# Patient Record
Sex: FEMALE | Race: WHITE | HISPANIC OR LATINO | Employment: FULL TIME | URBAN - METROPOLITAN AREA
[De-identification: names, ages, dates, MRNs, and addresses within clinical notes are randomized per-mention and may not be internally consistent; named-entity substitution may affect disease eponyms.]

---

## 2023-11-17 ENCOUNTER — APPOINTMENT (OUTPATIENT)
Dept: RADIOLOGY | Facility: MEDICAL CENTER | Age: 48
End: 2023-11-17
Attending: STUDENT IN AN ORGANIZED HEALTH CARE EDUCATION/TRAINING PROGRAM

## 2023-11-17 ENCOUNTER — HOSPITAL ENCOUNTER (EMERGENCY)
Facility: MEDICAL CENTER | Age: 48
End: 2023-11-17
Attending: STUDENT IN AN ORGANIZED HEALTH CARE EDUCATION/TRAINING PROGRAM

## 2023-11-17 VITALS
WEIGHT: 132.94 LBS | HEIGHT: 60 IN | HEART RATE: 78 BPM | DIASTOLIC BLOOD PRESSURE: 70 MMHG | BODY MASS INDEX: 26.1 KG/M2 | SYSTOLIC BLOOD PRESSURE: 113 MMHG | TEMPERATURE: 97.3 F | RESPIRATION RATE: 17 BRPM | OXYGEN SATURATION: 96 %

## 2023-11-17 DIAGNOSIS — R51.9 NONINTRACTABLE HEADACHE, UNSPECIFIED CHRONICITY PATTERN, UNSPECIFIED HEADACHE TYPE: ICD-10-CM

## 2023-11-17 DIAGNOSIS — R07.9 CHEST PAIN, UNSPECIFIED TYPE: ICD-10-CM

## 2023-11-17 DIAGNOSIS — R06.00 DYSPNEA, UNSPECIFIED TYPE: ICD-10-CM

## 2023-11-17 LAB
ALBUMIN SERPL BCP-MCNC: 4.4 G/DL (ref 3.2–4.9)
ALBUMIN/GLOB SERPL: 1.8 G/DL
ALP SERPL-CCNC: 60 U/L (ref 30–99)
ALT SERPL-CCNC: 18 U/L (ref 2–50)
ANION GAP SERPL CALC-SCNC: 11 MMOL/L (ref 7–16)
AST SERPL-CCNC: 22 U/L (ref 12–45)
BASOPHILS # BLD AUTO: 0.5 % (ref 0–1.8)
BASOPHILS # BLD: 0.04 K/UL (ref 0–0.12)
BILIRUB SERPL-MCNC: 0.2 MG/DL (ref 0.1–1.5)
BUN SERPL-MCNC: 20 MG/DL (ref 8–22)
CALCIUM ALBUM COR SERPL-MCNC: 9.2 MG/DL (ref 8.5–10.5)
CALCIUM SERPL-MCNC: 9.5 MG/DL (ref 8.5–10.5)
CHLORIDE SERPL-SCNC: 104 MMOL/L (ref 96–112)
CO2 SERPL-SCNC: 26 MMOL/L (ref 20–33)
CREAT SERPL-MCNC: 0.68 MG/DL (ref 0.5–1.4)
EKG IMPRESSION: NORMAL
EOSINOPHIL # BLD AUTO: 0.41 K/UL (ref 0–0.51)
EOSINOPHIL NFR BLD: 5 % (ref 0–6.9)
ERYTHROCYTE [DISTWIDTH] IN BLOOD BY AUTOMATED COUNT: 43.6 FL (ref 35.9–50)
GFR SERPLBLD CREATININE-BSD FMLA CKD-EPI: 106 ML/MIN/1.73 M 2
GLOBULIN SER CALC-MCNC: 2.5 G/DL (ref 1.9–3.5)
GLUCOSE SERPL-MCNC: 110 MG/DL (ref 65–99)
HCG SERPL QL: NEGATIVE
HCT VFR BLD AUTO: 43.2 % (ref 37–47)
HGB BLD-MCNC: 14.1 G/DL (ref 12–16)
IMM GRANULOCYTES # BLD AUTO: 0.02 K/UL (ref 0–0.11)
IMM GRANULOCYTES NFR BLD AUTO: 0.2 % (ref 0–0.9)
LYMPHOCYTES # BLD AUTO: 3.01 K/UL (ref 1–4.8)
LYMPHOCYTES NFR BLD: 36.8 % (ref 22–41)
MCH RBC QN AUTO: 28.5 PG (ref 27–33)
MCHC RBC AUTO-ENTMCNC: 32.6 G/DL (ref 32.2–35.5)
MCV RBC AUTO: 87.4 FL (ref 81.4–97.8)
MONOCYTES # BLD AUTO: 0.62 K/UL (ref 0–0.85)
MONOCYTES NFR BLD AUTO: 7.6 % (ref 0–13.4)
NEUTROPHILS # BLD AUTO: 4.09 K/UL (ref 1.82–7.42)
NEUTROPHILS NFR BLD: 49.9 % (ref 44–72)
NRBC # BLD AUTO: 0 K/UL
NRBC BLD-RTO: 0 /100 WBC (ref 0–0.2)
NT-PROBNP SERPL IA-MCNC: <36 PG/ML (ref 0–125)
PLATELET # BLD AUTO: 268 K/UL (ref 164–446)
PMV BLD AUTO: 9.9 FL (ref 9–12.9)
POTASSIUM SERPL-SCNC: 4 MMOL/L (ref 3.6–5.5)
PROT SERPL-MCNC: 6.9 G/DL (ref 6–8.2)
RBC # BLD AUTO: 4.94 M/UL (ref 4.2–5.4)
SODIUM SERPL-SCNC: 141 MMOL/L (ref 135–145)
TROPONIN T SERPL-MCNC: 8 NG/L (ref 6–19)
TROPONIN T SERPL-MCNC: <6 NG/L (ref 6–19)
WBC # BLD AUTO: 8.2 K/UL (ref 4.8–10.8)

## 2023-11-17 PROCEDURE — 83880 ASSAY OF NATRIURETIC PEPTIDE: CPT

## 2023-11-17 PROCEDURE — 85025 COMPLETE CBC W/AUTO DIFF WBC: CPT

## 2023-11-17 PROCEDURE — 99284 EMERGENCY DEPT VISIT MOD MDM: CPT

## 2023-11-17 PROCEDURE — 84703 CHORIONIC GONADOTROPIN ASSAY: CPT

## 2023-11-17 PROCEDURE — 36415 COLL VENOUS BLD VENIPUNCTURE: CPT

## 2023-11-17 PROCEDURE — 84484 ASSAY OF TROPONIN QUANT: CPT | Mod: 91

## 2023-11-17 PROCEDURE — 80053 COMPREHEN METABOLIC PANEL: CPT

## 2023-11-17 PROCEDURE — 93005 ELECTROCARDIOGRAM TRACING: CPT | Performed by: STUDENT IN AN ORGANIZED HEALTH CARE EDUCATION/TRAINING PROGRAM

## 2023-11-17 PROCEDURE — 71045 X-RAY EXAM CHEST 1 VIEW: CPT

## 2023-11-17 PROCEDURE — 93005 ELECTROCARDIOGRAM TRACING: CPT

## 2023-11-17 NOTE — ED TRIAGE NOTES
[facility] [place]  47 y.o. female  Chief Complaint   Patient presents with    Shortness of Breath     Episodes of SOB during sleep since yesterday.     Chest Pain     Since yesterday radiating to the back. Took nitro SL prior to coming to ER. Denies history of MI or stent placement.confirms nitro improved pain. Also mentions of difficulty swallowing saliva. Denies cough or fever.     Pt ambulatory to triage with steady gait for above complaint accompanied by family.    Pt is GCS 15, speaking in full sentences, follows commands and responds appropriately to questions. Resp are even and unlabored.     Chest pain and SOB protocol ordered. Pt placed in phlebotomy. Pt educated on triage process. Pt encouraged to alert staff for any changes.       Vitals:    11/17/23 0110   BP: (!) 157/102   Pulse: 94   Resp: 18   Temp: 35.9 °C (96.6 °F)   SpO2: 99%

## 2023-11-17 NOTE — ED PROVIDER NOTES
CHIEF COMPLAINT  Chief Complaint   Patient presents with    Shortness of Breath     Episodes of SOB during sleep since yesterday.     Chest Pain     Since yesterday radiating to the back. Took nitro SL prior to coming to ER. Denies history of MI or stent placement.confirms nitro improved pain. Also mentions of difficulty swallowing saliva. Denies cough or fever.       LIMITATION TO HISTORY   Select:   ELIUD Pettit is a 47 y.o. female who presents to the Emergency Department for evaluation of chest pressure since yesterday with shortness of breath she has been having the symptoms on and off for the last few months she reports she took a nitroglycerin just prior to arrival and that seems to have alleviated her pain slightly, she denies family history of coronary artery disease, reports history of hypertension, denies history of diabetes or dyslipidemia.  She reports the pain is worse at night and is shortness of breath at nighttime as well when lying down.  She denies swelling she denies nausea diaphoresis.  Patient reports she developed a headache after taking nitroglycerin    Pateint followed by cardiology [facility]'s provided patient a prescription of nitroglycerin, patient reports she has not had a stress test before she believes she has a test regarding her heart scheduled in February but does not know if it is heart catheterization or stress test.  She denies that she has had a stress test or heart catheterization before no prior history of myocardial infarction.    OUTSIDE HISTORIAN(S):  Select:     EXTERNAL RECORDS REVIEWED  Select: Other       PAST MEDICAL HISTORY  History reviewed. No pertinent past medical history.  .    SURGICAL HISTORY  History reviewed. No pertinent surgical history.      FAMILY HISTORY  History reviewed. No pertinent family history.       SOCIAL HISTORY  Social History     Socioeconomic History    Marital status: Not on file     Spouse  name: Not on file    Number of children: Not on file    Years of education: Not on file    Highest education level: Not on file   Occupational History    Not on file   Tobacco Use    Smoking status: Never    Smokeless tobacco: Never   Vaping Use    Vaping Use: Never used   Substance and Sexual Activity    Alcohol use: Never    Drug use: Never    Sexual activity: Not on file   Other Topics Concern    Not on file   Social History Narrative    Not on file     Social Determinants of Health     Financial Resource Strain: Not on file   Food Insecurity: Not on file   Transportation Needs: Not on file   Physical Activity: Not on file   Stress: Not on file   Social Connections: Not on file   Intimate Partner Violence: Not on file   Housing Stability: Not on file         CURRENT MEDICATIONS  No current facility-administered medications on file prior to encounter.     No current outpatient medications on file prior to encounter.           ALLERGIES  No Known Allergies    PHYSICAL EXAM  VITAL SIGNS:BP (!) 144/93   Pulse 92   Temp 35.9 °C (96.6 °F) (Temporal)   Resp 18   Ht 1.524 m (5')   Wt 60.3 kg (132 lb 15 oz)   LMP 11/17/2023 (Approximate)   SpO2 94%   BMI 25.96 kg/m²     GENERAL: Awake and alert  HEAD: Normocephalic and atraumatic  NECK: Normal range of motion, without meningismus  EYES: Pupils Equal, Round, Reactive to Light, extraocular movements intact, conjunctiva white  ENT: Mucous membranes moist, oropharynx clear  PULMONARY: Normal effort, clear to auscultation  CARDIOVASCULAR: No murmurs, clicks or rubs, peripheral pulses 2+  ABDOMINAL: Soft, non-tender, no guarding or rigidity present, no pulsatile masses  BACK: no midline tenderness, no costovertebral tenderness  NEUROLOGICAL: Grossly non-focal neurological examination, speech normal, gait normal  EXTREMITIES: No edema, normal to inspection  SKIN: Warm and dry.  PSYCHIATRIC: Affect is appropriate    DIAGNOSTIC STUDIES / PROCEDURES  EKG  Cardiac Monitor  Interpretation:    Time: 3:30 AM  The cardiac monitor revealed normal sinus rhythm as interpreted by me.  The cardiac monitor was ordered secondary to the patient´s history of chest pain and shortness of breath to monitor the patient for dysrhythmia     I have independently interpreted this EKG  1:15 AM  Rate of 90  Normal sinus rhythm  Normal ECG  QTc is 394 ms  Results for orders placed or performed during the hospital encounter of 23   EKG   Result Value Ref Range    Report       [facility] Emergency Dept.    Test Date:  2023  Pt Name:    REA MACK       Department: ER  MRN:        [id number]                      Room:  Gender:     Female                       Technician: [id number]  :        1975                   Requested By:ER TRIAGE PROTOCOL  Order #:    [id number]                    Reading MD:    Measurements  Intervals                                Axis  Rate:       90                           P:          77  LA:         141                          QRS:        19  QRSD:       84                           T:          29  QT:         322  QTc:        394    Interpretive Statements  Sinus rhythm  No previous ECG available for comparison           LABS  Labs Reviewed   CBC WITH DIFFERENTIAL   COMP METABOLIC PANEL   TROPONIN   TROPONIN   HCG QUAL SERUM   PROBRAIN NATRIURETIC PEPTIDE, NT       Troponin is normal blood work otherwise reassuring  RADIOLOGY  I independently reviewed and interpreted the images obtained today in the ER.  No evidence of pneumonia  Radiologist interpretation:   DX-CHEST-PORTABLE (1 VIEW)    (Results Pending)          COURSE & MEDICAL DECISION MAKING    ED COURSE:      INTERVENTIONS BY ME:  Medications - No data to display    Response on recheck:.    3:30 AM patient resting comfortably in no distress denies chest pain at this time  4:35 AM patient comfortable no pain normal vital signs discussed her work-up so far which is reassuring  discussed hospitalization for stress testing offered admission patient declined she would prefer to follow-up with her cardiologist.  Headache resolved she does not want Tylenol for headache            INITIAL ASSESSMENT, COURSE AND PLAN    Upon my interpretation of this patients symptomatology, EKG, examination, lab and imaging studies performed today, this patient's presentation is not consistent with pneumothorax, esophageal rupture, acute coronary syndrome, cardiac tamponade, pulmonary embolism, aortic dissection, or any other emergencies requiring immediate hospitalization.    Patient's symptomatology is mild to moderately suspicious for acute coronary syndrome.  She is PERC negative do not suspect pulmonary embolism considered work-up for pulmonary embolism.  Her pain was mild and resolved in the emergency department I do not suspect aortic dissection    --Pt risk-stratified as low risk for MACE in the next 6 weeks by low HEART Score (0-3): 3  HISTORY    Moderately suspicious +1    EKG    Normal 0  AGE    45-65 +1    RISK FACTORS  Hypercholesterolemia, HTN, DM, Cigarette smoking, positive family history, obesity    1-2 risk factors +1  TROPONIN    ? normal limit 0      ADDITIONAL PROBLEM LIST    DISPOSITION AND DISCUSSIONS      Escalation of care considered, and ultimately not performed:after discussion with the patient / family, they have elected to decline an escalation in care    FINAL DIAGNOSIS  No diagnosis found.         Electronically signed by: Trent Penaloza DO ,2:12 AM 11/17/23

## 2024-05-31 ENCOUNTER — APPOINTMENT (OUTPATIENT)
Dept: RADIOLOGY | Facility: MEDICAL CENTER | Age: 49
End: 2024-05-31
Attending: STUDENT IN AN ORGANIZED HEALTH CARE EDUCATION/TRAINING PROGRAM
Payer: MEDICAID

## 2024-05-31 ENCOUNTER — HOSPITAL ENCOUNTER (EMERGENCY)
Facility: MEDICAL CENTER | Age: 49
End: 2024-05-31
Attending: STUDENT IN AN ORGANIZED HEALTH CARE EDUCATION/TRAINING PROGRAM

## 2024-05-31 ENCOUNTER — ANESTHESIA (OUTPATIENT)
Dept: SURGERY | Facility: MEDICAL CENTER | Age: 49
End: 2024-05-31

## 2024-05-31 ENCOUNTER — ANESTHESIA EVENT (OUTPATIENT)
Dept: SURGERY | Facility: MEDICAL CENTER | Age: 49
End: 2024-05-31

## 2024-05-31 VITALS
BODY MASS INDEX: 21.34 KG/M2 | OXYGEN SATURATION: 97 % | WEIGHT: 108.69 LBS | SYSTOLIC BLOOD PRESSURE: 151 MMHG | TEMPERATURE: 96.8 F | HEART RATE: 81 BPM | HEIGHT: 60 IN | RESPIRATION RATE: 16 BRPM | DIASTOLIC BLOOD PRESSURE: 89 MMHG

## 2024-05-31 DIAGNOSIS — K81.9 CHOLECYSTITIS: ICD-10-CM

## 2024-05-31 DIAGNOSIS — K80.00 CALCULUS OF GALLBLADDER WITH ACUTE CHOLECYSTITIS WITHOUT OBSTRUCTION: ICD-10-CM

## 2024-05-31 DIAGNOSIS — K85.90 ACUTE PANCREATITIS, UNSPECIFIED COMPLICATION STATUS, UNSPECIFIED PANCREATITIS TYPE: ICD-10-CM

## 2024-05-31 PROBLEM — K80.20 CHOLELITHIASIS: Status: ACTIVE | Noted: 2024-05-31

## 2024-05-31 LAB
ALBUMIN SERPL BCP-MCNC: 3.9 G/DL (ref 3.2–4.9)
ALBUMIN/GLOB SERPL: 1.7 G/DL
ALP SERPL-CCNC: 79 U/L (ref 30–99)
ALT SERPL-CCNC: 265 U/L (ref 2–50)
ANION GAP SERPL CALC-SCNC: 12 MMOL/L (ref 7–16)
AST SERPL-CCNC: 340 U/L (ref 12–45)
BASOPHILS # BLD AUTO: 0.5 % (ref 0–1.8)
BASOPHILS # BLD: 0.03 K/UL (ref 0–0.12)
BILIRUB SERPL-MCNC: 0.4 MG/DL (ref 0.1–1.5)
BUN SERPL-MCNC: 13 MG/DL (ref 8–22)
CALCIUM ALBUM COR SERPL-MCNC: 8.9 MG/DL (ref 8.5–10.5)
CALCIUM SERPL-MCNC: 8.8 MG/DL (ref 8.5–10.5)
CHLORIDE SERPL-SCNC: 107 MMOL/L (ref 96–112)
CO2 SERPL-SCNC: 22 MMOL/L (ref 20–33)
CREAT SERPL-MCNC: 0.67 MG/DL (ref 0.5–1.4)
EKG IMPRESSION: NORMAL
EOSINOPHIL # BLD AUTO: 0.16 K/UL (ref 0–0.51)
EOSINOPHIL NFR BLD: 2.5 % (ref 0–6.9)
ERYTHROCYTE [DISTWIDTH] IN BLOOD BY AUTOMATED COUNT: 45.4 FL (ref 35.9–50)
GFR SERPLBLD CREATININE-BSD FMLA CKD-EPI: 106 ML/MIN/1.73 M 2
GLOBULIN SER CALC-MCNC: 2.3 G/DL (ref 1.9–3.5)
GLUCOSE SERPL-MCNC: 93 MG/DL (ref 65–99)
HCG SERPL QL: NEGATIVE
HCT VFR BLD AUTO: 39.5 % (ref 37–47)
HGB BLD-MCNC: 13.1 G/DL (ref 12–16)
IMM GRANULOCYTES # BLD AUTO: 0.01 K/UL (ref 0–0.11)
IMM GRANULOCYTES NFR BLD AUTO: 0.2 % (ref 0–0.9)
LIPASE SERPL-CCNC: 271 U/L (ref 11–82)
LYMPHOCYTES # BLD AUTO: 1.88 K/UL (ref 1–4.8)
LYMPHOCYTES NFR BLD: 29.7 % (ref 22–41)
MCH RBC QN AUTO: 29.8 PG (ref 27–33)
MCHC RBC AUTO-ENTMCNC: 33.2 G/DL (ref 32.2–35.5)
MCV RBC AUTO: 89.8 FL (ref 81.4–97.8)
MONOCYTES # BLD AUTO: 0.49 K/UL (ref 0–0.85)
MONOCYTES NFR BLD AUTO: 7.7 % (ref 0–13.4)
NEUTROPHILS # BLD AUTO: 3.76 K/UL (ref 1.82–7.42)
NEUTROPHILS NFR BLD: 59.4 % (ref 44–72)
NRBC # BLD AUTO: 0 K/UL
NRBC BLD-RTO: 0 /100 WBC (ref 0–0.2)
PATHOLOGY CONSULT NOTE: NORMAL
PLATELET # BLD AUTO: 382 K/UL (ref 164–446)
PMV BLD AUTO: 9 FL (ref 9–12.9)
POTASSIUM SERPL-SCNC: 3.8 MMOL/L (ref 3.6–5.5)
PROT SERPL-MCNC: 6.2 G/DL (ref 6–8.2)
RBC # BLD AUTO: 4.4 M/UL (ref 4.2–5.4)
SODIUM SERPL-SCNC: 141 MMOL/L (ref 135–145)
TROPONIN T SERPL-MCNC: <6 NG/L (ref 6–19)
WBC # BLD AUTO: 6.3 K/UL (ref 4.8–10.8)

## 2024-05-31 PROCEDURE — 700111 HCHG RX REV CODE 636 W/ 250 OVERRIDE (IP): Mod: JZ | Performed by: STUDENT IN AN ORGANIZED HEALTH CARE EDUCATION/TRAINING PROGRAM

## 2024-05-31 PROCEDURE — 47562 LAPAROSCOPIC CHOLECYSTECTOMY: CPT | Performed by: SURGERY

## 2024-05-31 PROCEDURE — 160002 HCHG RECOVERY MINUTES (STAT): Performed by: SURGERY

## 2024-05-31 PROCEDURE — 96374 THER/PROPH/DIAG INJ IV PUSH: CPT

## 2024-05-31 PROCEDURE — 700117 HCHG RX CONTRAST REV CODE 255: Performed by: STUDENT IN AN ORGANIZED HEALTH CARE EDUCATION/TRAINING PROGRAM

## 2024-05-31 PROCEDURE — 80053 COMPREHEN METABOLIC PANEL: CPT

## 2024-05-31 PROCEDURE — 160048 HCHG OR STATISTICAL LEVEL 1-5: Performed by: SURGERY

## 2024-05-31 PROCEDURE — 99291 CRITICAL CARE FIRST HOUR: CPT

## 2024-05-31 PROCEDURE — A9270 NON-COVERED ITEM OR SERVICE: HCPCS | Performed by: ANESTHESIOLOGY

## 2024-05-31 PROCEDURE — 93005 ELECTROCARDIOGRAM TRACING: CPT | Performed by: STUDENT IN AN ORGANIZED HEALTH CARE EDUCATION/TRAINING PROGRAM

## 2024-05-31 PROCEDURE — 160025 RECOVERY II MINUTES (STATS): Performed by: SURGERY

## 2024-05-31 PROCEDURE — 700102 HCHG RX REV CODE 250 W/ 637 OVERRIDE(OP): Performed by: ANESTHESIOLOGY

## 2024-05-31 PROCEDURE — 83690 ASSAY OF LIPASE: CPT

## 2024-05-31 PROCEDURE — 74177 CT ABD & PELVIS W/CONTRAST: CPT

## 2024-05-31 PROCEDURE — 84484 ASSAY OF TROPONIN QUANT: CPT

## 2024-05-31 PROCEDURE — 160009 HCHG ANES TIME/MIN: Performed by: SURGERY

## 2024-05-31 PROCEDURE — 160041 HCHG SURGERY MINUTES - EA ADDL 1 MIN LEVEL 4: Performed by: SURGERY

## 2024-05-31 PROCEDURE — 96375 TX/PRO/DX INJ NEW DRUG ADDON: CPT

## 2024-05-31 PROCEDURE — 700101 HCHG RX REV CODE 250: Performed by: SURGERY

## 2024-05-31 PROCEDURE — 36415 COLL VENOUS BLD VENIPUNCTURE: CPT

## 2024-05-31 PROCEDURE — 160029 HCHG SURGERY MINUTES - 1ST 30 MINS LEVEL 4: Performed by: SURGERY

## 2024-05-31 PROCEDURE — 88304 TISSUE EXAM BY PATHOLOGIST: CPT

## 2024-05-31 PROCEDURE — 76705 ECHO EXAM OF ABDOMEN: CPT

## 2024-05-31 PROCEDURE — 160046 HCHG PACU - 1ST 60 MINS PHASE II: Performed by: SURGERY

## 2024-05-31 PROCEDURE — 84703 CHORIONIC GONADOTROPIN ASSAY: CPT

## 2024-05-31 PROCEDURE — 85025 COMPLETE CBC W/AUTO DIFF WBC: CPT

## 2024-05-31 PROCEDURE — 700105 HCHG RX REV CODE 258: Performed by: STUDENT IN AN ORGANIZED HEALTH CARE EDUCATION/TRAINING PROGRAM

## 2024-05-31 PROCEDURE — 160035 HCHG PACU - 1ST 60 MINS PHASE I: Performed by: SURGERY

## 2024-05-31 RX ORDER — OXYCODONE HCL 5 MG/5 ML
5 SOLUTION, ORAL ORAL
Status: COMPLETED | OUTPATIENT
Start: 2024-05-31 | End: 2024-05-31

## 2024-05-31 RX ORDER — ONDANSETRON 2 MG/ML
4 INJECTION INTRAMUSCULAR; INTRAVENOUS ONCE
Status: COMPLETED | OUTPATIENT
Start: 2024-05-31 | End: 2024-05-31

## 2024-05-31 RX ORDER — OXYCODONE HCL 5 MG/5 ML
10 SOLUTION, ORAL ORAL
Status: COMPLETED | OUTPATIENT
Start: 2024-05-31 | End: 2024-05-31

## 2024-05-31 RX ORDER — OXYCODONE HYDROCHLORIDE 5 MG/1
5 TABLET ORAL EVERY 6 HOURS PRN
Qty: 8 TABLET | Refills: 0 | Status: SHIPPED | OUTPATIENT
Start: 2024-05-31 | End: 2024-06-02

## 2024-05-31 RX ORDER — SODIUM CHLORIDE, SODIUM LACTATE, POTASSIUM CHLORIDE, CALCIUM CHLORIDE 600; 310; 30; 20 MG/100ML; MG/100ML; MG/100ML; MG/100ML
INJECTION, SOLUTION INTRAVENOUS CONTINUOUS
Status: DISCONTINUED | OUTPATIENT
Start: 2024-05-31 | End: 2024-05-31 | Stop reason: HOSPADM

## 2024-05-31 RX ORDER — HALOPERIDOL 5 MG/ML
1 INJECTION INTRAMUSCULAR
Status: DISCONTINUED | OUTPATIENT
Start: 2024-05-31 | End: 2024-05-31 | Stop reason: HOSPADM

## 2024-05-31 RX ORDER — LIDOCAINE HYDROCHLORIDE 20 MG/ML
INJECTION, SOLUTION EPIDURAL; INFILTRATION; INTRACAUDAL; PERINEURAL PRN
Status: DISCONTINUED | OUTPATIENT
Start: 2024-05-31 | End: 2024-05-31 | Stop reason: SURG

## 2024-05-31 RX ORDER — HYDROMORPHONE HYDROCHLORIDE 1 MG/ML
0.1 INJECTION, SOLUTION INTRAMUSCULAR; INTRAVENOUS; SUBCUTANEOUS
Status: DISCONTINUED | OUTPATIENT
Start: 2024-05-31 | End: 2024-05-31 | Stop reason: HOSPADM

## 2024-05-31 RX ORDER — MEPERIDINE HYDROCHLORIDE 25 MG/ML
6.25 INJECTION INTRAMUSCULAR; INTRAVENOUS; SUBCUTANEOUS
Status: DISCONTINUED | OUTPATIENT
Start: 2024-05-31 | End: 2024-05-31 | Stop reason: HOSPADM

## 2024-05-31 RX ORDER — SODIUM CHLORIDE, SODIUM LACTATE, POTASSIUM CHLORIDE, CALCIUM CHLORIDE 600; 310; 30; 20 MG/100ML; MG/100ML; MG/100ML; MG/100ML
INJECTION, SOLUTION INTRAVENOUS
Status: DISCONTINUED | OUTPATIENT
Start: 2024-05-31 | End: 2024-05-31 | Stop reason: SURG

## 2024-05-31 RX ORDER — MORPHINE SULFATE 4 MG/ML
4 INJECTION INTRAVENOUS ONCE
Status: COMPLETED | OUTPATIENT
Start: 2024-05-31 | End: 2024-05-31

## 2024-05-31 RX ORDER — BUPIVACAINE HYDROCHLORIDE AND EPINEPHRINE 5; 5 MG/ML; UG/ML
INJECTION, SOLUTION EPIDURAL; INTRACAUDAL; PERINEURAL
Status: DISCONTINUED | OUTPATIENT
Start: 2024-05-31 | End: 2024-05-31 | Stop reason: HOSPADM

## 2024-05-31 RX ORDER — METRONIDAZOLE 500 MG/100ML
500 INJECTION, SOLUTION INTRAVENOUS ONCE
Status: COMPLETED | OUTPATIENT
Start: 2024-05-31 | End: 2024-05-31

## 2024-05-31 RX ORDER — SODIUM CHLORIDE, SODIUM LACTATE, POTASSIUM CHLORIDE, CALCIUM CHLORIDE 600; 310; 30; 20 MG/100ML; MG/100ML; MG/100ML; MG/100ML
1000 INJECTION, SOLUTION INTRAVENOUS ONCE
Status: COMPLETED | OUTPATIENT
Start: 2024-05-31 | End: 2024-05-31

## 2024-05-31 RX ORDER — DEXAMETHASONE SODIUM PHOSPHATE 4 MG/ML
INJECTION, SOLUTION INTRA-ARTICULAR; INTRALESIONAL; INTRAMUSCULAR; INTRAVENOUS; SOFT TISSUE PRN
Status: DISCONTINUED | OUTPATIENT
Start: 2024-05-31 | End: 2024-05-31 | Stop reason: SURG

## 2024-05-31 RX ORDER — HYDROMORPHONE HYDROCHLORIDE 1 MG/ML
0.4 INJECTION, SOLUTION INTRAMUSCULAR; INTRAVENOUS; SUBCUTANEOUS
Status: DISCONTINUED | OUTPATIENT
Start: 2024-05-31 | End: 2024-05-31 | Stop reason: HOSPADM

## 2024-05-31 RX ORDER — ACETAMINOPHEN 500 MG
1000 TABLET ORAL EVERY 6 HOURS PRN
COMMUNITY
Start: 2024-05-31

## 2024-05-31 RX ORDER — CEFAZOLIN SODIUM 1 G/3ML
INJECTION, POWDER, FOR SOLUTION INTRAMUSCULAR; INTRAVENOUS PRN
Status: DISCONTINUED | OUTPATIENT
Start: 2024-05-31 | End: 2024-05-31 | Stop reason: SURG

## 2024-05-31 RX ORDER — ONDANSETRON 2 MG/ML
INJECTION INTRAMUSCULAR; INTRAVENOUS PRN
Status: DISCONTINUED | OUTPATIENT
Start: 2024-05-31 | End: 2024-05-31 | Stop reason: SURG

## 2024-05-31 RX ORDER — IBUPROFEN 200 MG
600 TABLET ORAL EVERY 6 HOURS PRN
COMMUNITY
Start: 2024-05-31

## 2024-05-31 RX ORDER — ONDANSETRON 2 MG/ML
4 INJECTION INTRAMUSCULAR; INTRAVENOUS
Status: DISCONTINUED | OUTPATIENT
Start: 2024-05-31 | End: 2024-05-31 | Stop reason: HOSPADM

## 2024-05-31 RX ORDER — DIPHENHYDRAMINE HYDROCHLORIDE 50 MG/ML
12.5 INJECTION INTRAMUSCULAR; INTRAVENOUS
Status: DISCONTINUED | OUTPATIENT
Start: 2024-05-31 | End: 2024-05-31 | Stop reason: HOSPADM

## 2024-05-31 RX ORDER — KETOROLAC TROMETHAMINE 15 MG/ML
INJECTION, SOLUTION INTRAMUSCULAR; INTRAVENOUS PRN
Status: DISCONTINUED | OUTPATIENT
Start: 2024-05-31 | End: 2024-05-31 | Stop reason: SURG

## 2024-05-31 RX ORDER — CEFTRIAXONE 2 G/1
2000 INJECTION, POWDER, FOR SOLUTION INTRAMUSCULAR; INTRAVENOUS ONCE
Status: COMPLETED | OUTPATIENT
Start: 2024-05-31 | End: 2024-05-31

## 2024-05-31 RX ORDER — HYDROMORPHONE HYDROCHLORIDE 1 MG/ML
0.2 INJECTION, SOLUTION INTRAMUSCULAR; INTRAVENOUS; SUBCUTANEOUS
Status: DISCONTINUED | OUTPATIENT
Start: 2024-05-31 | End: 2024-05-31 | Stop reason: HOSPADM

## 2024-05-31 RX ADMIN — DEXAMETHASONE SODIUM PHOSPHATE 4 MG: 4 INJECTION INTRA-ARTICULAR; INTRALESIONAL; INTRAMUSCULAR; INTRAVENOUS; SOFT TISSUE at 07:46

## 2024-05-31 RX ADMIN — LIDOCAINE HYDROCHLORIDE 100 MG: 20 INJECTION, SOLUTION EPIDURAL; INFILTRATION; INTRACAUDAL at 07:46

## 2024-05-31 RX ADMIN — KETOROLAC TROMETHAMINE 15 MG: 15 INJECTION, SOLUTION INTRAMUSCULAR; INTRAVENOUS at 08:14

## 2024-05-31 RX ADMIN — SUGAMMADEX 200 MG: 100 INJECTION, SOLUTION INTRAVENOUS at 08:14

## 2024-05-31 RX ADMIN — METRONIDAZOLE 500 MG: 500 INJECTION, SOLUTION INTRAVENOUS at 06:22

## 2024-05-31 RX ADMIN — ONDANSETRON 4 MG: 2 INJECTION INTRAMUSCULAR; INTRAVENOUS at 07:46

## 2024-05-31 RX ADMIN — CEFTRIAXONE SODIUM 2000 MG: 2 INJECTION, POWDER, FOR SOLUTION INTRAMUSCULAR; INTRAVENOUS at 06:22

## 2024-05-31 RX ADMIN — SODIUM CHLORIDE, POTASSIUM CHLORIDE, SODIUM LACTATE AND CALCIUM CHLORIDE 1000 ML: 600; 310; 30; 20 INJECTION, SOLUTION INTRAVENOUS at 05:22

## 2024-05-31 RX ADMIN — IOHEXOL 80 ML: 350 INJECTION, SOLUTION INTRAVENOUS at 05:15

## 2024-05-31 RX ADMIN — FAMOTIDINE 20 MG: 10 INJECTION, SOLUTION INTRAVENOUS at 05:02

## 2024-05-31 RX ADMIN — CEFAZOLIN 2 G: 1 INJECTION, POWDER, FOR SOLUTION INTRAMUSCULAR; INTRAVENOUS at 07:50

## 2024-05-31 RX ADMIN — SODIUM CHLORIDE, POTASSIUM CHLORIDE, SODIUM LACTATE AND CALCIUM CHLORIDE: 600; 310; 30; 20 INJECTION, SOLUTION INTRAVENOUS at 07:41

## 2024-05-31 RX ADMIN — MORPHINE SULFATE 4 MG: 4 INJECTION INTRAVENOUS at 05:02

## 2024-05-31 RX ADMIN — ROCURONIUM BROMIDE 50 MG: 10 INJECTION, SOLUTION INTRAVENOUS at 07:46

## 2024-05-31 RX ADMIN — OXYCODONE HYDROCHLORIDE 5 MG: 5 SOLUTION ORAL at 09:22

## 2024-05-31 RX ADMIN — ONDANSETRON 4 MG: 2 INJECTION INTRAMUSCULAR; INTRAVENOUS at 05:03

## 2024-05-31 RX ADMIN — PROPOFOL 200 MG: 10 INJECTION, EMULSION INTRAVENOUS at 07:46

## 2024-05-31 RX ADMIN — Medication 100 MG: at 07:46

## 2024-05-31 ASSESSMENT — PAIN DESCRIPTION - PAIN TYPE
TYPE: ACUTE PAIN
TYPE: SURGICAL PAIN

## 2024-05-31 ASSESSMENT — FIBROSIS 4 INDEX: FIB4 SCORE: 0.97

## 2024-05-31 ASSESSMENT — PAIN DESCRIPTION - DESCRIPTORS: DESCRIPTORS: SHARP

## 2024-05-31 NOTE — DISCHARGE INSTRUCTIONS
Instrucciones de nate para colecistectomía laparoscópica    ACTIVIDADES: Después del nate del hospital, podrá reanudar todas stanley actividades de rutina. Sin embargo, no debe levantar objetos pesados (más de 15 libras) ni realizar actividades extenuantes hasta después de byrd visita de seguimiento. De lo contrario, las actividades rutinarias de la christy diaria son aceptables.    CONDUCIR: Puede conducir siempre que no esté tomando analgésicos y pueda realizar las actividades necesarias para conducir, es decir, girar, agacharse, girar, etc.    BAÑO: Puede mojar la herida en cualquier momento después de salir del hospital. Puede ducharse, philomena no sumergirse en la bañera ge al menos sameer semana.    CUIDADO DE HERIDAS:    Es normal sentir sensibilidad, malestar o sameer leve hinchazón en el lugar de las incisiones.  Si tiene apósitos para heridas, es posible que se desprendan después de 48 horas. Si tiene pegamento para piel en la herida, suzy se caerá solo, no lo toque. Si tiene tiras esterilizadas en la herida, estas se caerán solas, no las toque, puede recortar los bordes si es necesario.    Evite que lociones, polvos o desodorantes entren en contacto con la incisión mientras se está curando. No se preocupe si el área debajo de cualquiera de las incisiones se siente firme o dura. [place] es normal y suele suavizarse al cabo de unos meses.    FUNCIÓN INTESTINAL: Algunos pacientes, después de esta operación, desarrollarán deposiciones frecuentes o blandas después de las comidas. [place] generalmente se corrige solo después de unos días o algunas semanas. Si esto ocurre, no te preocupes; no es inusual y se resolverá. Mucho más común que las heces blandas es el estreñimiento. La combinación de analgésicos y disminución del nivel de actividad puede causar estreñimiento en pacientes por lo demás normales. Si siente que esto está ocurriendo, tome un laxante (Leche de Magnesia, Ex-Lax, Senokot, etc.) hasta que el problema se haya  resuelto.    También ayuda a mantener la regularidad incluyendo fibra en la dieta (por ejemplo: salvado o frutas y verduras) y bebiendo muchos líquidos (agua, jugo, etc.).      Post Operative Discharge Instructions:    1. DIET: Upon discharge from the hospital, you may resume your normal preoperative diet, unless specifically directed otherwise. Depending on how you are feeling and whether you have nausea or not, you may wish to stay with a bland diet for the first few days. However, you can advance this as quickly as you feel ready.    DIETA: Al recibir el nate del hospital, puede reanudar byrd dieta preoperatoria normal, a menos que se le indique específicamente lo contrario. Dependiendo de cómo te sientas y de si tienes náuseas o no, es posible que desees seguir sameer dieta blanda ge los primeros días. Sin embargo, puede avanzar en esto tan rápido syed se sienta listo.      2. ACTIVITIES: After discharge from the hospital, you may resume full routine activities; however, there should be no heavy lifting (greater than 20 pounds or a bag of groceries) and no strenuous activities for at least 2 weeks. The duration may be longer, depending on your surgical procedure. Routine activities of daily living are acceptable. Activity level should be addressed at your post-op follow up appointment.    ACTIVIDADES: Después del nate del hospital, podrá reanudar todas stanley actividades de rutina; sin embargo, no debe levantar objetos pesados (más de 20 libras o sameer bolsa de comestibles) ni realizar actividades extenuantes ge al menos 2 semanas. La duración puede ser mayor, dependiendo de byrd procedimiento quirúrgico. Las actividades rutinarias de la christy diaria son aceptables. El nivel de actividad debe abordarse en byrd ivelisse de seguimiento posoperatorio.    3. DRIVING: You may drive whenever you are off pain medications and are able to perform the activities needed to drive, i.e., turning, bending, twisting, etc.   CONDUCIR:  Puede conducir siempre que no esté tomando analgésicos y pueda realizar las actividades necesarias para conducir, es decir, girar, agacharse, torcerse, etc.     4. BATHING: You may get the wound wet at any time after leaving the hospital. You may shower, but do not submerge in a bath for at least two weeks.    If you have skin glue to the wound, this will fall off on its own, do not pick at it.    BAÑO: Puede mojar la herida en cualquier momento después de salir del hospital. Puede ducharse, philomena no sumergirse en un baño ge al menos dos semanas.   Si tiene pegamento para piel en la herida, suzy se caerá solo, no lo toque.    5. BOWEL FUNCTION:   After surgery, it is not uncommon for patients to develop either frequent or loose stools after meals. This usually corrects itself after a few days, to a few weeks. If this occurs, do not worry; this will resolve on its own.  Constipation is much more common than loose stools. The cause is the combination of pain medication and decreased activity level and possibly the nature of the surgical procedure performed. If you feel this is occurring, you may use an over-the-counter treatment such as MiraLAX (or Milk of Magnesia, Ex-Lax, Senokot, etc.) until the problem has resolved. Drink plenty of water and try to wean off narcotic pain medications as soon as is comfortable for you.      FUNCIÓN INTESTINAL:    Después de la cirugía, no es raro que los pacientes presenten deposiciones frecuentes o blandas después de las comidas. [place] generalmente se corrige solo después de unos días o algunas semanas. Si esto ocurre, no te preocupes; esto se resolverá por sí solo.   El estreñimiento es mucho más común que las heces blandas. La causa es la combinación de analgésicos y disminución del nivel de actividad y posiblemente la naturaleza del procedimiento quirúrgico realizado. Si francisco j que esto está ocurriendo, puede utilizar un tratamiento de venta julian syed MiraLAX (o Leche de  Magnesia, Ex-Lax, Senokot, etc.) hasta que el problema se haya resuelto. Bhavani mucha agua y trate de dejar de lenny analgésicos narcóticos tan pronto syed le resulte cómodo.    6. PAIN MEDICATION:   You will be given a prescription for pain medication at discharge. Please take these as directed. It is important to remember not to take medications on an empty stomach as this may cause nausea.  You may also take over the counter acetaminophen and/or NSAIDS (ibuprofen, Aleve, Advil, Motrin) per the package instructions.  You may also use ice to the wound to decrease pain and swelling. You may alternate 20 minutes on and 20 minutes off with the ice for the first 24-48 hours. Make sure you place a washcloth or towel between the ice pack and your skin.  Please note that narcotic pain medication cannot be refilled unless you are seen by a doctor. Make sure you call the office if you are running low on medication or if the dose you have been prescribed is not working well for you.    ANALGÉSICO:    Se le dará sameer receta para analgésicos al momento del nate. Tómelos según las indicaciones. Es importante recordar no lenny medicamentos con el estómago vacío ya que esto puede provocar náuseas.   También puede lenny acetaminofén y/o CASSY (ibuprofeno, Aleve, Advil, Motrin) de venta julian según las instrucciones del paquete.   También puede aplicar hielo en la herida para disminuir el dolor y la hinchazón. Puedes alternar 20 minutos con hielo y 20 minutos sin él ge las primeras 24 a 48 horas. Asegúrese de colocar sameer toallita o sameer toalla entre la bolsa de hielo y byrd piel.   Tenga en cuenta que los analgésicos narcóticos no se pueden reabastecer a menos que sea atendido por un médico. Asegúrese de llamar al consultorio si se está quedando sin medicamento o si la dosis que le recetaron no le está funcionando yaritza.      7.CALL THE [place] [facility] OFFICE AT [phone] IF YOU HAVE:  (1) Fevers to more than 101F, (2) Unusual  chest or leg pain, (3) Drainage or fluid from incision that may be foul smelling, increased tenderness or soreness at the wound or the wound edges are no longer together, redness or swelling at the incision site. Do not hesitate to call with any other questions.     7. LLAME A LA OFICINA ASIMICA YUMI JAMISON [phone] SI TIENE:  (1) Fiebre de más de 101 °F, (2) Dolor inusual en el pecho o en las piernas, (3) Drenaje o líquido de la incisión que puede tener mal olor, mayor sensibilidad o dolor en la herida o los bordes de la herida ya no están juntos, enrojecimiento o hinchazón en el sitio de la incisión. No dude en llamar si tiene alguna otra pregunta.    Instrucciones Para [place]  (Home Care Instructions)    ACTIVIDAD: Descanse y tome todo con mucha calma las primeras 24 horas después de byrd cirugía.   persona adulta responsable debe permanecer con usted ge bryan periodo de tiempo.  Es normal sentirse sonoliento o sonolienta ge esas primeras horas.  Le recomendamos que no prasad nada que requiera equilibrio, coco decisiones a mucha coordinación de byrd parte.    NO PRASAD ESTO PURANTE LAS PRIMERAS 24 HORAS:   Manejar o conducir algún vehiculo, operar maquinarias o utilizar electrodomesticos.   Beber cerveza o algún otro tipo de bebida alcohólica.   Coco decisiones importantes o firmar documentos legales.          MEDICAMENTOS/MEDICINAS:  Vuelva a coco stanley medicamentos diarios.  Emporium los medicamentos que se le prescribe con un poco de comida.  Si no le prescribe ningún tipo de medicamento, entonces puede coco medicinas para el dolor que no contienen aspirina, si las necesita.  LAS MEDICINAS PARA EL DOLOR PUEDEN ESTREÑIRLE MUCHO.  Emporium un suavizante para el excremento o materia fecal (stool softener) o un laxativo syed por ejemplo: senokot, pericolase, o leche de magnesia, si lo necesita.    La prescripción la administro Roxicodone   La ultima sosis de medicina para el dolor fue administrada Oxycodone  at 08:22 am.     Se debe hacer sameer consulta medica con el doctor en  [phone], Líame para hacer la ivelisse.    Usted debe LIAMAR A BYRD MEDICO si tiene los siguientes síntomas:   -   Sameer fiebre más nate de 101 grados Fahrenheit.   -   Un dolor incesante aún con los medicamentos, o nauseas y vómito persistente.   -   Un sangrado excesivo (betty que traspasa los vendajes o gasas) o algúln tipo de drenaje inesperado que proviene de la henda.     -   Un color niño exagerado o hinchazón alrededor del área en donde se le hizo incisión o johanna, o un drenaje de pus o con olor sylwia proveniente de la henda.   -    La inhabilidad de orinar o vaciar byrd vejiga en 8 horas.   -    Problemas con a respiración o baron en el pecho.    Usted debe llamar al 911 si se presentan problemas con el dolor al respirar o el pecho.  Si no se puede ponnoer en comunicación con un medica o con el centro de cirugía, usted debe ir a la estación de emergencia (emergency room) más cercana o a un centro de atención de urgencia (urgent care center).  El teléfono del medico es: [phone]    LOS SÍNTOMAS DE UN LEVE RESFRIO SON MUY NORMALES.  ADEMÁS USTED PUEDE LLEGAR A SENTIR BARON GENERALES DE MÚSCULOS, IRRITACIÓN EN LA GARGANTA, BARON DE HERBER Y/O UN POCO DE NAUSEAS.    Sie tiene alguna pregunta, llame a byrd médico.  Si byrd médico no se encuentra disponible, por favor llame al Centro de Cirugía at [phone].  el Centro está abierto de Lunes a Viernes desde las 7:00 de la manana hasta las 5:00 de la noche.      Mi firma a continuación indica que he recibido y entiendco estas instrucciones acera de los cuidados en la casa (Home Care Instructions)    Usted recibirá sameer encuesta en la correspondencia en las siguientes semanas y le pedimos que por favor tome un momento para completar blossom encuesta y regresaría a hosotros.  Nuestro objetivó es brindarle un cuidado muy adams y par lo tanto apreciamos stanley coméntanos.  Muchas raafel por jr escogido  el [facility].

## 2024-05-31 NOTE — PROGRESS NOTES
PREOPERATIVE NOTE: I have seen and examined the patient today.  Critical physical examination findings, laboratory indices, and radiographic studies reviewed.  I recommend a laparoscopic cholecystectomy.    The operative strategy was explained and reviewed. Alternatives discussed. Potential complications including, but not limited to, infection, bleeding, damage to adjacent structures, bile leak, injury to the common bile duct, need to convert to an open operation, and anesthetic complications were discussed. Questions were elicited and answered to the patient's satisfaction.  Operative consent signed.        ____________________________________     Ignacio Bolaños M.D.    DD: 5/31/2024  7:07 AM

## 2024-05-31 NOTE — ANESTHESIA TIME REPORT
Anesthesia Start and Stop Event Times       Date Time Event    5/31/2024 0741 Ready for Procedure     0741 Anesthesia Start     0845 Anesthesia Stop          Responsible Staff  05/31/24      Name Role Begin End    Bogdan Mancilla M.D. Anesth 0741 0845          Overtime Reason:  no overtime (within assigned shift)    Comments:

## 2024-05-31 NOTE — ED TRIAGE NOTES
Chief Complaint   Patient presents with    RUQ Pain     Started 4 hours ago, Pt reports laying down when this happened, about 3 months ago pt had surgery in [place]. Pt states she had a lap for a hernia. Pt states the pain feels different. Pt reports the pain radiates into her back    Back Pain     RUQ pain that radiates into her back. Denies N/V. Denies Fever. Pt reports hx of HTN.        48 y.o. female ambulatory to triage for above complaint. Pt denies any other abdominal surgeries. Pt reports she still has her gallbladder.  used. GCS 15.     Pt is alert and oriented, speaking in full sentences, follows commands and responds appropriately to questions. NAD. Resp are even and unlabored.      Pt placed in lobby. Pt educated on triage process. Pt encouraged to alert staff for any changes.     Patient and staff wearing appropriate PPE    BP (!) 158/97   Pulse 79   Temp 36.1 °C (96.9 °F) (Temporal)   Resp 17   Ht 1.524 m (5')   Wt 49.3 kg (108 lb 11 oz)   LMP 05/07/2024   SpO2 99%   BMI 21.23 kg/m²

## 2024-05-31 NOTE — ANESTHESIA POSTPROCEDURE EVALUATION
Patient: Madison Pettit    Procedure Summary       Date: 05/31/24 Room / Location: [facility] OR 09 / SURGERY [facility]    Anesthesia Start: 0741 Anesthesia Stop: 0845    Procedure: CHOLECYSTECTOMY, LAPAROSCOPIC (Abdomen) Diagnosis: (cholecystitis)    Surgeons: Ignacio Bolaños M.D. Responsible Provider: Bogdan Mancilla M.D.    Anesthesia Type: general ASA Status: 2            Final Anesthesia Type: general  Last vitals  BP   Blood Pressure: (!) 162/85    Temp   36 °C (96.8 °F)    Pulse   61   Resp   14    SpO2   100 %      Anesthesia Post Evaluation    Patient location during evaluation: PACU  Patient participation: complete - patient participated  Level of consciousness: awake and alert    Airway patency: patent  Anesthetic complications: no  Cardiovascular status: hemodynamically stable  Respiratory status: acceptable  Hydration status: euvolemic    PONV: none          No notable events documented.     Nurse Pain Score: 5 (NPRS)

## 2024-05-31 NOTE — OR NURSING
0838: Pt arrived from OR, handoff received from anesthesiologist and RN. Patient asleep, breathing even and unlabored. Vitals stable. On 6L oxygen via mask. Four incisions to abdomen with derma bond, C/D/I.    0855: Patient awake, oriented x4, moving all extremities. Denies pain and nausea at this time.     0915: Daughter updated omn status.     0925: Medicated for pain per MAR.     0930: Phase 1 completed.    1000: Patient states pain at tolerable level. Tolerating oral fluids. On room air. Incisions remain clean/dry.     1004: Report given to phase 2 Nadya VAIL.

## 2024-05-31 NOTE — ANESTHESIA PROCEDURE NOTES
Airway    Date/Time: 5/31/2024 7:47 AM    Performed by: Bogdan Mancilla M.D.  Authorized by: Bogdan Mancilla M.D.    Location:  OR  Urgency:  Elective  Indications for Airway Management:  Anesthesia      Spontaneous Ventilation: absent    Sedation Level:  Deep  Preoxygenated: Yes    Patient Position:  Sniffing  Final Airway Type:  Endotracheal airway  Final Endotracheal Airway:  ETT  Cuffed: Yes    Technique Used for Successful ETT Placement:  Direct laryngoscopy    Insertion Site:  Oral  Blade Type:  Sammy  Laryngoscope Blade/Videolaryngoscope Blade Size:  3  ETT Size (mm):  7.0  Measured from:  Teeth  ETT to Teeth (cm):  18  Placement Verified by: auscultation and capnometry    Cormack-Lehane Classification:  Grade I - full view of glottis  Number of Attempts at Approach:  1

## 2024-05-31 NOTE — ANESTHESIA PREPROCEDURE EVALUATION
Case: [id number] Date/Time: 05/31/24 0715    Procedure: CHOLECYSTECTOMY, LAPAROSCOPIC    Location: [facility] OR 09 / SURGERY [facility]    Surgeons: Ignacio Bolaños M.D.            Relevant Problems   No relevant active problems       Physical Exam    Airway   Mallampati: II  TM distance: >3 FB  Neck ROM: full       Cardiovascular - normal exam  Rhythm: regular  Rate: normal  (-) murmur     Dental - normal exam           Pulmonary - normal exam  Breath sounds clear to auscultation     Abdominal    Neurological - normal exam                   Anesthesia Plan    ASA 2       Plan - general       Airway plan will be ETT          Induction: intravenous    Postoperative Plan: Postoperative administration of opioids is intended.    Pertinent diagnostic labs and testing reviewed    Informed Consent:    Anesthetic plan and risks discussed with patient.    Use of blood products discussed with: patient whom consented to blood products.

## 2024-05-31 NOTE — ED NOTES
To pre OP via gurney; all belongings with family; patient changed to gown only; AOX4 GCS15 on room air.

## 2024-05-31 NOTE — H&P
CHIEF COMPLAINT: acute cholecystitis     HISTORY OF PRESENT ILLNESS: The patient is a 48 year-old  woman who presents to the Emergency Department a 6-hour history of severe epigastric abdominal pain. The pain is associated with nausea. She reports intermittent precipitation and exacerbation of symptoms with ingestion of all types of foods.  She has had 4 similar attacks in the past The patient denies any recent or intercurrent illness. The patient has undergone a laparoscopic Nissen fundoplication.  At the time of my exam she states that her pain has largely resolved.    PAST MEDICAL HISTORY:  has a past medical history of Hypertension.    PAST SURGICAL HISTORY:   Laparoscopic Nissen fundoplication    ALLERGIES: No Known Allergies    CURRENT MEDICATIONS:    Home Medications       Reviewed by Marie Waggoner R.N. (Registered Nurse) on 05/31/24 at 0400  Med List Status: Partial     Medication Last Dose Status        Patient Jose Luis Taking any Medications                         Audit from Redirected Encounters    **Home medications have not yet been reviewed for this encounter**         FAMILY HISTORY: family history is not on file.    SOCIAL HISTORY:  reports that she has never smoked. She has never used smokeless tobacco. She reports that she does not drink alcohol and does not use drugs.    REVIEW OF SYSTEMS: Comprehensive review of systems is negative with the exception of the aforementioned HPI, PMH, and PSH bullets in accordance with CMS guidelines.    PHYSICAL EXAMINATION:      Constitutional:     Vital Signs: BP (!) 150/84   Pulse 68   Temp 36.1 °C (96.9 °F) (Temporal)   Resp 16   Ht 1.524 m (5')   Wt 49.3 kg (108 lb 11 oz)   SpO2 95%    General Appearance: appears stated age, is in no apparent distress.  HEENT: The pupils are equal, round, and reactive to light bilaterally. The extraocular muscles are intact bilaterally. The sclera are non icteric. Nares and oropharynx are clear.   Neck: Supple.  No adenopathy.  Respiratory:   Inspection: Unlabored respirations, no intercostal retractions, paradoxical motion, or accessory muscle use.   Auscultation: normal.  Cardiovascular:   Inspection: The skin is warm.  Auscultation: Regular rate and rhythm.   Peripheral Pulses: Normal.   Abdomen:  Inspection: Abdominal inspection reveals  well-healed laparoscopic incisions .   Palpation: Palpation is remarkable for no significant tenderness, guarding, or peritoneal findings. No abdominal wall hernias.  Extremities:   Examination of the upper and lower extremities demonstrates no cyanosis edema or clubbing.  Neurologic:   Alert & oriented to person, time and place. Normal motor function. Normal sensory function. No focal deficits noted.    LABORATORY VALUES:   Recent Labs     05/31/24  0446   WBC 6.3   RBC 4.40   HEMOGLOBIN 13.1   HEMATOCRIT 39.5   MCV 89.8   MCH 29.8   MCHC 33.2   RDW 45.4   PLATELETCT 382   MPV 9.0     Recent Labs     05/31/24  0446   SODIUM 141   POTASSIUM 3.8   CHLORIDE 107   CO2 22   GLUCOSE 93   BUN 13   CREATININE 0.67   CALCIUM 8.8     Recent Labs     05/31/24  0446   ASTSGOT 340*   ALTSGPT 265*   TBILIRUBIN 0.4   ALKPHOSPHAT 79   GLOBULIN 2.3            IMAGING:   US-RUQ   Final Result         1.  Gallbladder sludge and gallbladder wall thickening with trace pericholecystic fluid, consider acalculous cholecystitis. Could be further evaluated with HIDA scan as clinically appropriate.   2.  Echogenic liver, compatible with fatty change versus fibrosis.      CT-ABDOMEN-PELVIS WITH   Final Result         1.  Mild gallbladder wall thickening with trace pericholecystic fluid, could represent changes of cholecystitis. Could be further evaluated with right upper quadrant sonogram.   2.  Hepatomegaly   3.  Diverticulosis   4.  Indeterminate left adrenal nodule, follow-up adrenal protocol CT for further characterization as clinically appropriate.          ASSESSMENT AND PLAN:   48-year-old woman with a  history of hypertension otherwise generally in good health now with evidence by history, physical, laboratory data, and imaging of cholecystitis, potentially acute.  A cholecystectomy is indicated.  I discussed this in detail with her including the laparoscopic approach, potential converting to an open procedure, and the associated risks of bleeding, infection, abscess, and hematoma.  I also discussed the risks of postop bile leak, common bile duct stricture, common bile duct transection, and the significance of these complications.  She understands all the above and does wish to proceed.  We will make arrangements.        DISPOSITION: Admit [facility] [place] [facility].     ____________________________________     George Gutierrez M.D.    DD: 5/31/2024  6:37 AM    [facility] Guidelines for Acute Cholecystitis 2018  AAST Grading System for EGS Conditions  ACS NSQIP Surgical Risk Calculator

## 2024-05-31 NOTE — OP REPORT
DATE OF OPERATION:   5/31/2024     PREOPERATIVE DIAGNOSIS: Right upper quadrant pain, cholelithiasis, gallbladder sludge, and gallstone pancreatitis.    POSTOPERATIVE DIAGNOSIS: Right upper quadrant pain, cholelithiasis, gallbladder sludge, and gallstone pancreatitis.    PROCEDURE PERFORMED: Laparoscopic cholecystectomy    SURGEON:    Ignacio Bolaños M.D.    ASSISTANT:    Yajaira Rivera PA-C.     ANESTHESIOLOGIST:  Bogdan Mancilla M.D.    ANESTHESIA:   General endotracheal anesthesia.    ASA CLASSIFICATION:  II.    INDICATIONS: The patient is a 48 year-old woman with clinical and radiographic findings of gallbladder sludge and mild acute gallstone pancreatitis. She is taken to the operating room for a laparoscopic cholecystectomy.     The nature of the surgical procedure warranted additional skilled operative assistance from a licensed Physician Assistant (BERNARD). The assistant was present during the entire operation. The surgical assistant performed the following: provided assistance with optimal surgical exposure of the operative field, provided high complexity, subspecialty decision making input, operated the camera for the laparoscopic portion of the procedure, assisted with the surgical resection, and performed the skin closure and dressing application.     FINDINGS: Mild inflammation of the gallbladder. Multiple gallstones.    WOUND CLASSIFICATION:  Class III, Contaminated.    SPECIMEN:    Gallbladder.    ESTIMATED BLOOD LOSS:  Less than 5 mL.    PROCEDURE: Following informed consent consent, the patient was properly identified, taken to the operating room and placed in supine position where general endotracheal anesthesia was administered. Intravenous antibiotics were administered by the anesthesiologist in correct time interval. The patient voided prior to surgery. A urinary catheter was not placed. Sequential compression devices were employed. The abdomen was prepped and draped into a sterile field. A  timeout was conducted with the full attention and participation of all operating room personnel.    Marcaine 0.5% was used to infiltrate the port sites. A 5 mm infraumbilical midline incision was made and the subcutaneous tissues spread bluntly. The fascia was elevated with a hook and a Veress needle was atraumatically inserted. Carbon dioxide pneumoperitoneum was instilled. A 5 mm separator port was passed. A 5 mm 30 degree lens and camera was passed into the peritoneal cavity. An 11 mm port was placed in the epigastric midline under direct vision. Two 5 mm right subcostal ports were placed under direct vision.     The gallbladder was identified and elevated. Dissection was carried out to completely expose and delineate the hepatocystic triangle. The critical view was achieved definitively identifying the single cystic duct and single cystic artery entering the gallbladder. These structures were multiply clipped proximally, once distally and divided. The gallbladder was dissected free from the undersurface of the liver using electrocautery and placed within a laparoscopic specimen retrieval bag. The gallbladder was delivered intact from the abdominal cavity and submitted for pathology.  The gallbladder fossa was irrigated. All excess irrigant was evacuated from the abdominal cavity.  The gallbladder fossa was inspected. Hemostasis was satisfactory.    The epigastric port site fascia was approximated with a single 0 VICRYL® Plus Antibacterial suture. The abdomen was desufflated and the ports were removed.  The port site skin incisions were closed with interrupted 4-0 VICRYL® Plus Antibacterial subcuticular sutures. A DERMABOND ADVANCED® Topical Skin Adhesive dressing was applied.    The patient tolerated the procedure well, and there were no apparent complications. All sponge, needle, and instrument counts were correct on 2 separate occasions. The patient was awakened, extubated, and transferred to  to the post  anesthesia care unit (PACU) in satisfactory condition.       ____________________________________     Ignacio Bolaños M.D.    DD: 5/31/2024  8:31 AM

## 2024-05-31 NOTE — PROGRESS NOTES
1008 pt arrived in phase II. A&Ox4, VSS, able to void, tolerating PO. Ambulates, tolerating well. Lap sites x4, CDI    1022 family at bedside, D/C instructions discussed with pt and family. All questions/concerns addressed. Language line  used, Roe ID [id number].     1034 PIV removed    1041 pt escorted to vehicle with hospital staff in stable condition.

## 2024-05-31 NOTE — ED PROVIDER NOTES
ED Provider Note    CHIEF COMPLAINT  Chief Complaint   Patient presents with    RUQ Pain     Started 4 hours ago, Pt reports laying down when this happened, about 3 months ago pt had surgery in [place]. Pt states she had a lap for a hernia. Pt states the pain feels different. Pt reports the pain radiates into her back    Back Pain     RUQ pain that radiates into her back. Denies N/V. Denies Fever. Pt reports hx of HTN.        EXTERNAL RECORDS REVIEWED      HPI/ROS  LIMITATION TO HISTORY   Select: Language Northern Irish,  Used   OUTSIDE HISTORIAN(S):      Madison Pettit is a 48 y.o. female who presents with right upper quadrant abdominal pain.  Patient says that for the past 4 hours she has been having pain in her right upper quadrant that radiates to her back.  Patient says that this woke her from sleep.  Patient describes some associated nausea.  Patient denies recent trauma.  Patient denies chest pain, shortness of breath, cough.  Patient denies changes in urination, vaginal bleeding or discharge.    PAST MEDICAL HISTORY   has a past medical history of Hypertension.    SURGICAL HISTORY  patient denies any surgical history    FAMILY HISTORY  History reviewed. No pertinent family history.    SOCIAL HISTORY  Social History     Tobacco Use    Smoking status: Never    Smokeless tobacco: Never   Vaping Use    Vaping status: Never Used   Substance and Sexual Activity    Alcohol use: Never    Drug use: Never    Sexual activity: Not on file       CURRENT MEDICATIONS  Home Medications       Reviewed by Marie Waggoner R.N. (Registered Nurse) on 05/31/24 at 0400  Med List Status: Partial     Medication Last Dose Status        Patient Jose Luis Taking any Medications                         Audit from Redirected Encounters    **Home medications have not yet been reviewed for this encounter**         ALLERGIES  No Known Allergies    PHYSICAL EXAM  VITAL SIGNS: BP (!) 150/84   Pulse 68   Temp 36.1 °C  (96.9 °F) (Temporal)   Resp 16   Ht 1.524 m (5')   Wt 49.3 kg (108 lb 11 oz)   LMP 2024   SpO2 95%   BMI 21.23 kg/m²    Constitutional: Alert in no apparent distress.  HENT: No signs of trauma, Bilateral external ears normal, Nose normal.   Eyes: Pupils are equal and reactive, Conjunctiva normal, Non-icteric.   Neck: Normal range of motion, No tenderness, Supple, No stridor.   Cardiovascular: Regular rate and rhythm, no murmurs.   Thorax & Lungs: Normal breath sounds, No respiratory distress, No wheezing, No chest tenderness.   Abdomen: Bowel sounds normal, Soft, tenderness in the epigastrium, No masses, No pulsatile masses.   Skin: Warm, Dry, No erythema, No rash.   Back: No bony tenderness, No CVA tenderness.   Extremities: Intact distal pulses, No edema, No tenderness, No cyanosis  Musculoskeletal: Good range of motion in all major joints. No tenderness to palpation or major deformities noted.   Neurologic: Alert , Normal motor function, Normal sensory function, No focal deficits noted.       EKG/LABS  Labs Reviewed   COMP METABOLIC PANEL - Abnormal; Notable for the following components:       Result Value    AST(SGOT) 340 (*)     ALT(SGPT) 265 (*)     All other components within normal limits   LIPASE - Abnormal; Notable for the following components:    Lipase 271 (*)     All other components within normal limits   CBC WITH DIFFERENTIAL   HCG QUAL SERUM   TROPONIN   ESTIMATED GFR   URINALYSIS,CULTURE IF INDICATED     Results for orders placed or performed during the hospital encounter of 24   EKG (NOW)   Result Value Ref Range    Report       [facility] Emergency Dept.    Test Date:  2024  Pt Name:    REA MACK       Department: ER  MRN:        [id number]                      Room:        13  Gender:     Female                       Technician: [id number]  :        [id number]                   Requested By:KELLY MEDRANO  Order #:    [id number]                     Reading MD: El Sloan    Measurements  Intervals                                Axis  Rate:       68                           P:          70  CT:         158                          QRS:        23  QRSD:       80                           T:          33  QT:         367  QTc:        391    Interpretive Statements  Interpreted by me: Sinus rhythm, rate 68, normal intervals, no signs of acute  ischemia when compared to prior  Electronically Signed On 05- 05:58:48 PDT by El Sloan         I have independently interpreted this EKG    RADIOLOGY/PROCEDURES   I have independently interpreted the diagnostic imaging associated with this visit and am waiting the final reading from the radiologist.   My preliminary interpretation is as follows: No free air    Radiologist interpretation:  CT-ABDOMEN-PELVIS WITH   Final Result         1.  Mild gallbladder wall thickening with trace pericholecystic fluid, could represent changes of cholecystitis. Could be further evaluated with right upper quadrant sonogram.   2.  Hepatomegaly   3.  Diverticulosis   4.  Indeterminate left adrenal nodule, follow-up adrenal protocol CT for further characterization as clinically appropriate.      US-RUQ    (Results Pending)       COURSE & MEDICAL DECISION MAKING    ASSESSMENT, COURSE AND PLAN  Care Narrative: On arrival patient noted to have epigastric tenderness appears uncomfortable.  Differential includes cholecystitis, biliary disease, pancreatitis, atypical ACS, pneumonia.  ECG without signs of acute ischemia.  Will initiate IV fluids, analgesics antiemetics and obtain CT imaging.    CT shows signs of gallbladder sludge and pericholecystic fluid, mild thickening.  Findings concerning for acute cholecystitis.  Patient does have signs of pancreatitis based on lipase level as well as elevation in LFTs.  Given these changes and CT findings spoke with Dr. Gutierrez from general surgery who plans to admit the  patient and consider surgical intervention.  Will cover patient with antibiotics.  Plan to obtain right upper quadrant ultrasound to assess for findings of acute cholecystitis or ductal dilation.            ADDITIONAL PROBLEMS MANAGED      DISPOSITION AND DISCUSSIONS  I have discussed management of the patient with the following physicians and TY's: General surgery      FINAL DIAGNOSIS  1. Cholecystitis    2. Acute pancreatitis, unspecified complication status, unspecified pancreatitis type           Electronically signed by: lE Sloan D.O., 5/31/2024 4:25 AM

## 2024-06-12 NOTE — PROGRESS NOTES
"    HISTORY OF PRESENT ILLNESS: The patient is a 48 year-old woman who returns to the [facility] for routine follow-up after undergoing a laparoscopic cholecystectomy for acute cholecystitis by Ignacio Bolaños MD on 5/31/2024. Since surgery, she is having no nausea or vomiting.    CURRENT MEDICATIONS:  Current Outpatient Medications   Medication Sig    acetaminophen (TYLENOL) 500 MG Tab Take 2 Tablets by mouth every 6 hours as needed for Mild Pain or Moderate Pain.    ibuprofen (MOTRIN) 200 MG Tab Take 3 Tablets by mouth every 6 hours as needed for Mild Pain or Fever.       PHYSICAL EXAMINATION:  Vital Signs: /68   Pulse 76   Resp 16   Ht 1.549 m (5' 1\")   Wt 46.7 kg (103 lb)   SpO2 98%   Physical Exam  Vitals and nursing note reviewed.   Abdominal:      General: Abdomen is flat. Bowel sounds are normal.      Palpations: Abdomen is soft.      Tenderness: There is no abdominal tenderness. There is no guarding or rebound.      Comments: Port sites are clean   Skin:     General: Skin is warm and dry.      Capillary Refill: Capillary refill takes less than 2 seconds.   Neurological:      General: No focal deficit present.      Mental Status: She is oriented to person, place, and time.         LABORATORY VALUES:  Lab Results   Component Value Date/Time    WBC 6.3 05/31/2024 04:46 AM    RBC 4.40 05/31/2024 04:46 AM    HEMOGLOBIN 13.1 05/31/2024 04:46 AM    HEMATOCRIT 39.5 05/31/2024 04:46 AM    MCV 89.8 05/31/2024 04:46 AM    MCH 29.8 05/31/2024 04:46 AM    MCHC 33.2 05/31/2024 04:46 AM    MPV 9.0 05/31/2024 04:46 AM    NEUTSPOLYS 59.40 05/31/2024 04:46 AM    LYMPHOCYTES 29.70 05/31/2024 04:46 AM    MONOCYTES 7.70 05/31/2024 04:46 AM    EOSINOPHILS 2.50 05/31/2024 04:46 AM    BASOPHILS 0.50 05/31/2024 04:46 AM     Lab Results   Component Value Date/Time    SODIUM 141 05/31/2024 04:46 AM    POTASSIUM 3.8 05/31/2024 04:46 AM    CHLORIDE 107 05/31/2024 04:46 AM    CO2 22 05/31/2024 04:46 AM " "   GLUCOSE 93 05/31/2024 04:46 AM    BUN 13 05/31/2024 04:46 AM    CREATININE 0.67 05/31/2024 04:46 AM     No results found for: \"PROTHROMBTM\", \"INR\"    IMAGING:  No orders to display       PATHOLOGY: Final pathology demonstrated Acute and chronic cholecystitis. Cholelithiasis. Cholesterolosis.    ASSESSMENT AND PLAN:  Satisfactory progress following a laparoscopic cholecystectomy.    Patient continues to have chest and back pain that have been present since had surgery in [place] for repair of hiatal hernia ~ 3 months ago - Recommed seeing GI to evaluate.    Return to work note. Lifting restrictions reviewed. Discharge from the [facility].  Return to work tomorrow  Lifting restrictions - 20 lbs x 6 weeks       ____________________________________     Buck Ponce M.D.    DD: 6/14/2024  2:25 PM    "

## 2024-06-14 ENCOUNTER — OFFICE VISIT (OUTPATIENT)
Dept: SURGERY | Facility: MEDICAL CENTER | Age: 49
End: 2024-06-14
Attending: SURGERY

## 2024-06-14 VITALS
HEIGHT: 61 IN | SYSTOLIC BLOOD PRESSURE: 109 MMHG | RESPIRATION RATE: 16 BRPM | BODY MASS INDEX: 19.45 KG/M2 | OXYGEN SATURATION: 98 % | HEART RATE: 76 BPM | DIASTOLIC BLOOD PRESSURE: 68 MMHG | WEIGHT: 103 LBS

## 2024-06-14 DIAGNOSIS — K80.00 CALCULUS OF GALLBLADDER WITH ACUTE CHOLECYSTITIS WITHOUT OBSTRUCTION: ICD-10-CM

## 2024-06-14 DIAGNOSIS — Z90.49 STATUS POST LAPAROSCOPIC CHOLECYSTECTOMY: ICD-10-CM

## 2024-06-14 PROCEDURE — 99024 POSTOP FOLLOW-UP VISIT: CPT | Performed by: PHYSICIAN ASSISTANT

## 2024-06-14 ASSESSMENT — FIBROSIS 4 INDEX
FIB4 SCORE: 2.73
FIB4 SCORE: 2.73

## 2024-07-15 ENCOUNTER — APPOINTMENT (OUTPATIENT)
Dept: RADIOLOGY | Facility: MEDICAL CENTER | Age: 49
End: 2024-07-15
Attending: STUDENT IN AN ORGANIZED HEALTH CARE EDUCATION/TRAINING PROGRAM
Payer: COMMERCIAL

## 2024-07-15 ENCOUNTER — HOSPITAL ENCOUNTER (EMERGENCY)
Facility: MEDICAL CENTER | Age: 49
End: 2024-07-15
Attending: STUDENT IN AN ORGANIZED HEALTH CARE EDUCATION/TRAINING PROGRAM
Payer: COMMERCIAL

## 2024-07-15 VITALS
WEIGHT: 110.01 LBS | TEMPERATURE: 96.8 F | HEIGHT: 61 IN | SYSTOLIC BLOOD PRESSURE: 156 MMHG | RESPIRATION RATE: 16 BRPM | HEART RATE: 74 BPM | OXYGEN SATURATION: 99 % | DIASTOLIC BLOOD PRESSURE: 100 MMHG | BODY MASS INDEX: 20.77 KG/M2

## 2024-07-15 DIAGNOSIS — S20.219A CONTUSION OF STERNUM, INITIAL ENCOUNTER: ICD-10-CM

## 2024-07-15 LAB — EKG IMPRESSION: NORMAL

## 2024-07-15 PROCEDURE — 93005 ELECTROCARDIOGRAM TRACING: CPT

## 2024-07-15 PROCEDURE — 99283 EMERGENCY DEPT VISIT LOW MDM: CPT

## 2024-07-15 PROCEDURE — 71045 X-RAY EXAM CHEST 1 VIEW: CPT

## 2024-07-15 PROCEDURE — 93005 ELECTROCARDIOGRAM TRACING: CPT | Performed by: STUDENT IN AN ORGANIZED HEALTH CARE EDUCATION/TRAINING PROGRAM

## 2024-07-15 RX ORDER — LIDOCAINE 50 MG/G
1 PATCH TOPICAL EVERY 24 HOURS
Qty: 10 PATCH | Refills: 0 | Status: SHIPPED | OUTPATIENT
Start: 2024-07-15 | End: 2024-08-09

## 2024-07-15 ASSESSMENT — PAIN DESCRIPTION - PAIN TYPE: TYPE: ACUTE PAIN

## 2024-07-15 ASSESSMENT — FIBROSIS 4 INDEX: FIB4 SCORE: 2.73

## 2024-08-09 ENCOUNTER — OCCUPATIONAL MEDICINE (OUTPATIENT)
Dept: URGENT CARE | Facility: CLINIC | Age: 49
End: 2024-08-09
Payer: COMMERCIAL

## 2024-08-09 VITALS
DIASTOLIC BLOOD PRESSURE: 72 MMHG | HEIGHT: 61 IN | SYSTOLIC BLOOD PRESSURE: 110 MMHG | HEART RATE: 76 BPM | WEIGHT: 106.3 LBS | RESPIRATION RATE: 16 BRPM | TEMPERATURE: 97.2 F | BODY MASS INDEX: 20.07 KG/M2 | OXYGEN SATURATION: 99 %

## 2024-08-09 DIAGNOSIS — S20.219D CONTUSION OF STERNUM, SUBSEQUENT ENCOUNTER: ICD-10-CM

## 2024-08-09 PROCEDURE — 3078F DIAST BP <80 MM HG: CPT | Performed by: PHYSICIAN ASSISTANT

## 2024-08-09 PROCEDURE — 3074F SYST BP LT 130 MM HG: CPT | Performed by: PHYSICIAN ASSISTANT

## 2024-08-09 PROCEDURE — 99203 OFFICE O/P NEW LOW 30 MIN: CPT | Performed by: PHYSICIAN ASSISTANT

## 2024-08-09 ASSESSMENT — FIBROSIS 4 INDEX: FIB4 SCORE: 2.73

## 2024-08-09 NOTE — LETTER
PHYSICIAN’S AND CHIROPRACTIC PHYSICIAN'S   PROGRESS REPORT CERTIFICATION OF DISABILITY Claim Number:     Social Security Number:    Patient’s Name:Madison Pettit Date of Injury:7/13/2024   Employer:  [employer] Name of O (if applicable)      Patient’s Job Description/Occupation: Janitorial       Previous Injuries/Diseases/Surgeries Contributing to the Condition:  No      Diagnosis:  (S20.219D) Contusion of sternum, subsequent encounter      Related to the Industrial Injury? Yes     Explain:       Objective Medical Findings:No pain. Problem resolved.      X  None - Discharged                         Stable  Yes                 Ratable  Yes     X  Generally Improved                        Condition Worsened                 Condition Same  May Have Suffered a Permanent Disability  No     Treatment Plan:             No Change in Therapy                 PT/OT Prescribed                     Medication May be Used While Working       Case Management                        PT/OT Discontinued    Consultation    Further Diagnostic Studies:                               Prescription(s)                           X  Released to FULL DUTY /No Restrictions on (Date):  From:      Certified TOTALLY TEMPORARILY DISABLED (Indicate Dates) From:   To:      Released to RESTRICTED/Modified Duty on (Date): From:   To:                                                                 Restrictions Are:  Temporary     No Sitting                               No Standing                   No Pulling                  Other:      No Bending at Waist           No Stooping                    No Lifting       No Carrying                           No Walking                Lifting Restricted to (lbs.):       No Pushing                            No Climbing                   No Reaching Above Shoulders   Date of Next Visit:    Date of this Exam:8/9/2024 Physician/Chiropractic Physician Name:Mike Rivera P.A.-C.  Physician/Chiropractic Physician Signature:  Geoffrey Disla DO MPH   D-[id number] (Rev. 2/24)

## 2024-08-09 NOTE — PROGRESS NOTES
"Subjective     Madison Jing Bernard Pettit is a very pleasant 48 y.o. female who presents with Follow-Up ([facility] DOI: 07/13/2024 chest pain feels a lot better )            HPI  Sternum contusion at work.  Symptoms have resolved and no further concerns.  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 Problem resolved.                   Assessment & Plan         1. Contusion of sternum, subsequent encounter          Problem has resolved.  Discharge MMI to full duty      I personally reviewed prior external notes and test results pertinent to today's visit. Return to clinic or go to ED if symptoms worsen or persist. Red flag symptoms and indications for ED discussed at length. Patient/Parent/Guardian voices understanding.  AVS with post-visit instructions printed and provided or given verbally.  Follow-up with your primary care provider in 3-5 days. All side effects and potential interactions of prescribed medication discussed including allergic response, GI upset, tendon injury, rash, sedation, OCP effectiveness, etc.    Please note that this dictation was created using voice recognition software. I have made every reasonable attempt to correct obvious errors, but I expect that there are errors of grammar and possibly content that I did not discover before finalizing the note.          "

## (undated) DEVICE — SET LEADWIRE 5 LEAD BEDSIDE DISPOSABLE ECG (1SET OF 5/EA)

## (undated) DEVICE — CLIP MED LG INTNL HRZN TI ESCP - (20/BX)

## (undated) DEVICE — GOWN WARMING STANDARD FLEX - (30/CA)

## (undated) DEVICE — TROCAR 5X100 BLADED Z-THREAD - KII (6/BX)

## (undated) DEVICE — GLOVE BIOGEL INDICATOR SZ 7.5 SURGICAL PF LTX - (50PR/BX 4BX/CA)

## (undated) DEVICE — CHLORAPREP 26 ML APPLICATOR - ORANGE TINT(25/CA)

## (undated) DEVICE — TUBING CLEARLINK DUO-VENT - C-FLO (48EA/CA)

## (undated) DEVICE — SET EXTENSION WITH 2 PORTS (48EA/CA) ***PART #2C8610 IS A SUBSTITUTE*****

## (undated) DEVICE — SENSOR OXIMETER ADULT SPO2 RD SET (20EA/BX)

## (undated) DEVICE — BAG RETRIEVAL 10ML (10EA/BX)

## (undated) DEVICE — SUTURE GENERAL

## (undated) DEVICE — TROCAR Z THREAD11MM OPTICAL - NON BLADED(6/BX)

## (undated) DEVICE — CANNULA W/SEAL 5X100 Z-THRE - ADED KII (12/BX)

## (undated) DEVICE — SUCTION INSTRUMENT YANKAUER BULBOUS TIP W/O VENT (50EA/CA)

## (undated) DEVICE — SUTURE 0 VICRYL PLUS UR-6 - 27 INCH (36/BX)

## (undated) DEVICE — SET SUCTION/IRRIGATION WITH DISPOSABLE TIP (6/CA )PART #0250-070-520 IS A SUB

## (undated) DEVICE — COVER LIGHT HANDLE ALC PLUS DISP (18EA/BX)

## (undated) DEVICE — CANISTER SUCTION 3000ML MECHANICAL FILTER AUTO SHUTOFF MEDI-VAC NONSTERILE LF DISP  (40EA/CA)

## (undated) DEVICE — ELECTRODE DUAL RETURN W/ CORD - (50/PK)

## (undated) DEVICE — SODIUM CHL IRRIGATION 0.9% 1000ML (12EA/CA)

## (undated) DEVICE — NEEDLE INSFL 120MM 14GA VRRS - (20/BX)

## (undated) DEVICE — LACTATED RINGERS INJ 1000 ML - (14EA/CA 60CA/PF)

## (undated) DEVICE — SUTURE 4-0 VICRYL PLUS FS-2 - 27 INCH (36/BX)

## (undated) DEVICE — PACK LAP CHOLE OR - (2EA/CA)

## (undated) DEVICE — GLOVE BIOGEL SZ 7.5 SURGICAL PF LTX - (50PR/BX 4BX/CA)

## (undated) DEVICE — DERMABOND ADVANCED - (12EA/BX)